# Patient Record
Sex: MALE
[De-identification: names, ages, dates, MRNs, and addresses within clinical notes are randomized per-mention and may not be internally consistent; named-entity substitution may affect disease eponyms.]

---
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
## 2021-01-06 LAB
5OH-INDOLEACETATE UR-SCNC: 2.3 MG/G CREAT
CREAT UR-MCNC: 169 MG/DL
VMA/CREAT UR: 2.3 MG/G CR

## 2021-02-02 ENCOUNTER — APPOINTMENT (OUTPATIENT)
Dept: NEUROLOGY | Facility: CLINIC | Age: 21
End: 2021-02-02

## 2021-03-29 ENCOUNTER — APPOINTMENT (OUTPATIENT)
Dept: NEUROLOGY | Facility: CLINIC | Age: 21
End: 2021-03-29